# Patient Record
Sex: FEMALE | Race: WHITE
[De-identification: names, ages, dates, MRNs, and addresses within clinical notes are randomized per-mention and may not be internally consistent; named-entity substitution may affect disease eponyms.]

---

## 2019-03-13 ENCOUNTER — HOSPITAL ENCOUNTER (OUTPATIENT)
Dept: HOSPITAL 65 - MRI | Age: 64
Discharge: HOME | End: 2019-03-13
Attending: SPECIALIST
Payer: MEDICARE

## 2019-03-13 DIAGNOSIS — M51.26: ICD-10-CM

## 2019-03-13 DIAGNOSIS — M41.85: ICD-10-CM

## 2019-03-13 DIAGNOSIS — M12.88: ICD-10-CM

## 2019-03-13 DIAGNOSIS — M48.062: ICD-10-CM

## 2019-03-13 DIAGNOSIS — M47.816: Primary | ICD-10-CM

## 2019-03-13 PROCEDURE — 72148 MRI LUMBAR SPINE W/O DYE: CPT

## 2019-03-13 NOTE — DIREP
PROCEDURE:MRI L SPINE W O CONTRAST

 

TECHNIQUE:Multiplanar MR images of the lumbar spine were obtained without 

contrast.  

 

COMPARISON:[facility], MR, MRI SPINE LUMBAR W/O, 12/14/2016, 

03:33 PM.

 

INDICATIONS:SPONDYLOSIS, LUMBARSACRAL REGION

 

FINDINGS:

ALIGNMENT:Levoscoliosis centered at T12-L1.

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  There is 

depression of the superior endplate of T12 which is new as compared to the 

previous study.  There is lack of marrow edema indicating that this is remote 

injury.

PARASPINAL AREA:Normal.

OTHER:The conus medullaris is normal in signal and morphology at the proximal 

L1 level.

 

LUMBAR DISC LEVELS

T12-L1:Normal.

L1-L2:Normal.

L2-L3:Normal.

L3-L4:The disc is narrowed and desiccated.  There is circumferential disc 

bulging.  There is bilateral ligamentum flavum thickening and facet arthrosis.  

There is mild central canal narrowing with mild bilateral foraminal narrowing.

L4-L5:The disc is narrowed asymmetrically to the left with Modic type 2 

changes at the adjacent endplates.  There is bilateral facet arthrosis.  There 

is no central canal stenosis.  There is mild foraminal narrowing inferiorly on 

the left.

L5-S1:Normal.

 

CONCLUSION:

 

1.  New depression of the superior endplate of T12 as compared to previous 

study.  There is lack of marrow edema indicating remote injury.

 

2.  Levoscoliosis centered at T12-L1.

 

3.  Degenerative changes at the L3-4 and L4-5 levels unchanged from previous 

study. 

 

 

 

Dictated by: Indra Perez M.D. on 03/13/2019 at 06:56 PM     

Electronically Signed By: Indra Perez M.D. on 03/13/2019 at 07:04 PM

## 2021-02-19 ENCOUNTER — HOSPITAL ENCOUNTER (OUTPATIENT)
Dept: HOSPITAL 65 - NPLAB | Age: 66
Discharge: HOME | End: 2021-02-19
Attending: FAMILY MEDICINE
Payer: MEDICARE

## 2021-02-19 DIAGNOSIS — I50.9: Primary | ICD-10-CM

## 2021-02-19 LAB
ALP INTEST CFR SERPL: 77 U/L (ref 50–136)
ALT SERPL-CCNC: 42 U/L (ref 12–78)
AST SERPL-CCNC: 37 U/L (ref 0–35)
CALCIUM SERPL-MCNC: 8.3 MG/DL (ref 8.4–10.5)
CO2 BLDA-SCNC: 33.8 MMOL/L (ref 20–32)
ERYTHROCYTE [DISTWIDTH] IN BLOOD BY AUTOMATED COUNT: 14.6 % (ref 11.5–14.5)
GLUCOSE PRE 100 G GLC PO SERPL-MCNC: 110 MG/DL (ref 70–110)
MCH RBC QN AUTO: 30.6 PG (ref 26–34)
PLATELET # BLD AUTO: 270 10^3/UL (ref 150–400)

## 2021-02-19 PROCEDURE — 84443 ASSAY THYROID STIM HORMONE: CPT

## 2021-02-19 PROCEDURE — 80053 COMPREHEN METABOLIC PANEL: CPT

## 2021-02-19 PROCEDURE — 85027 COMPLETE CBC AUTOMATED: CPT

## 2021-03-05 ENCOUNTER — HOSPITAL ENCOUNTER (OUTPATIENT)
Dept: HOSPITAL 65 - NPLAB | Age: 66
Discharge: HOME | End: 2021-03-05
Attending: FAMILY MEDICINE
Payer: MEDICARE

## 2021-03-05 DIAGNOSIS — J96.10: Primary | ICD-10-CM

## 2021-03-05 DIAGNOSIS — G93.41: ICD-10-CM

## 2021-03-05 LAB
ALP INTEST CFR SERPL: 74 U/L (ref 50–136)
ALT SERPL-CCNC: 28 U/L (ref 12–78)
AST SERPL-CCNC: 18 U/L (ref 0–35)
CALCIUM SERPL-MCNC: 8.9 MG/DL (ref 8.4–10.5)
CO2 BLDA-SCNC: 36.3 MMOL/L (ref 20–32)
ERYTHROCYTE [DISTWIDTH] IN BLOOD BY AUTOMATED COUNT: 13.7 % (ref 11.5–14.5)
GLUCOSE PRE 100 G GLC PO SERPL-MCNC: 66 MG/DL (ref 70–110)
MCH RBC QN AUTO: 30.4 PG (ref 26–34)
PLATELET # BLD AUTO: 287 10^3/UL (ref 150–400)

## 2021-03-05 PROCEDURE — 80053 COMPREHEN METABOLIC PANEL: CPT

## 2021-03-05 PROCEDURE — 85027 COMPLETE CBC AUTOMATED: CPT

## 2021-03-25 ENCOUNTER — HOSPITAL ENCOUNTER (OUTPATIENT)
Dept: HOSPITAL 65 - RAD | Age: 66
Discharge: HOME | End: 2021-03-25
Attending: PHYSICIAN ASSISTANT
Payer: MEDICARE

## 2021-03-25 DIAGNOSIS — M16.0: Primary | ICD-10-CM

## 2021-03-25 DIAGNOSIS — M43.8X6: ICD-10-CM

## 2021-03-25 DIAGNOSIS — M47.816: ICD-10-CM

## 2021-03-25 PROCEDURE — 73502 X-RAY EXAM HIP UNI 2-3 VIEWS: CPT

## 2021-03-25 PROCEDURE — 72100 X-RAY EXAM L-S SPINE 2/3 VWS: CPT

## 2021-03-25 NOTE — DIREP
PROCEDURE:XRAY HIP MIN 2 VW-RT

 

COMPARISON:None.

 

INDICATIONS:PAIN IN RIGHT HIP M25.551, SACROILIITIS M46.1, LOW BACK PAIN M54.5

 

FINDINGS:

BONES:No acute fracture.  No aggressive osseous lesion.

JOINTS:Mild degenerative change of the bilateral femoral acetabular joints 

including joint space narrowing.  There is degenerative change of the right 

greater than left sacroiliac joint.

SOFT TISSUES:Normal.

OTHER:Inter spinous process spacers at the lower lumbar spine.  Metallic clip 

overlies the left pelvis..

 

CONCLUSION:

No acute fracture.

Mild degenerative change of the hips.

 

 

Dictated by: Madi Ruiz MD on 03/25/2021 at 04:32 PM     

Electronically Signed By: aMdi Ruiz MD on 03/25/2021 at 04:34 PM

## 2021-03-25 NOTE — DIREP
PROCEDURE:XRAY SPINE LUMBAR 2-3 VWS

 

COMPARISON:None.

 

INDICATIONS:LOW BACK PAIN M54.5, SACROILIITIS M46.1

 

FINDINGS:

ALIGNMENT:Straightening of the normal lumbar lordosis.  Levocurvature of the 

lumbar spine.

VERTEBRAE:The vertebral heights are preserved.  There posterior interspinous 

process spacers at the L3-L4 L4-5 levels.  There is facet joint hypertrophy 

throughout the mid lower lumbar spine.

DISK SPACES:There is disc space narrowing throughout the lumbar spine, worse 

at L4-5 and L5-S1.  Endplate sclerosis is seen at L4-5.

SPONDYLOLISTHESIS:No significant spondylolisthesis.

SACROILIAC JOINTS:Degenerative change of the right greater left sacroiliac 

joints.

OTHER:Extensive calcification of the infrarenal abdominal aorta.

 

 

CONCLUSION:

Multilevel degenerative changes lumbar spine, as detailed above.  Findings are 

most prominent at L4-5 and L5-S1.

Interspinous process spacers are seen at the L3-L4 and L4-5 levels.  Hardware 

is well seated and intact.

 

 

Dictated by: Madi Ruiz MD on 03/25/2021 at 04:34 PM     

Electronically Signed By: Madi Ruiz MD on 03/25/2021 at 04:36 PM

## 2021-06-08 ENCOUNTER — HOSPITAL ENCOUNTER (OUTPATIENT)
Dept: HOSPITAL 65 - RAD | Age: 66
Discharge: HOME | End: 2021-06-08
Attending: INTERNAL MEDICINE
Payer: MEDICARE

## 2021-06-08 DIAGNOSIS — Z12.31: Primary | ICD-10-CM

## 2021-06-08 DIAGNOSIS — M85.88: ICD-10-CM

## 2021-06-08 PROCEDURE — 77067 SCR MAMMO BI INCL CAD: CPT

## 2021-06-08 PROCEDURE — 77080 DXA BONE DENSITY AXIAL: CPT

## 2021-06-08 NOTE — DIREP
PROCEDURE:BONE DENSITY PERIPHERAL

INDICATIONS:OSTEOPOROSIS SCREENIG

 

COMPARISON:None.

 

FINDINGS:

Femur

 

Proximal RIGHT femur bone mineral density (BMD) (g/cm2):  0.767 T-score : 

-1.9



Proximal LEFT femur bone mineral density (BMD) (g/cm2):  0.741T-score: -2.1



LEFT FOREARM 

 

1/3:

bone mineral density (g/cm  squared):  0.768T-score: -1.3

 

UD:

 bone mineral density (g/cm  squared):  0.320T-score: -3.3

 

Total:   

bone mineral density (g/cm  squared):  0.578T-score: -1.7

 

 

CONCLUSION:

1.  Osteopenia overall right radius.  Osteopenia overall bilateral hips.

 

2. Based on the [place] FRAX study, the patient's 10-year probability of a 

major osteoporotic fracture

(clinical spine, forearm, hip or shoulder) is 10.3 %, and the 10-year 

probability of a hip fracture is 1.5 %.

 

SUGGESTED RECOMMENDATIONS:

Normal & Osteopenia:Consideration should be given to use of calcium 

supplementation, daily multiple vitamins and adequate exercise, as preventive 

measures against osteoporosis, if clinically indicated.

 

Osteoporosis & Severe Osteoporosis:In addition to the above, consideration 

should be given to medical therapy against osteoporosis, if clinically 

indicated.

 

 

Dictated by: George Calderón M.D. on 06/08/2021 at 04:52 PM     

Electronically Signed By: George Calderón M.D. on 06/08/2021 at 04:54 PM

## 2021-06-08 NOTE — DIREP
PROCEDURE:Digital Screening Mammogram

 

TECHNIQUE:MLO and CC digital images of each breast are provided.  Computer 

Assisted Detection (CAD) was utilized.  

 

 

COMPARISON:None.

 

INDICATIONS:SCREENING

 

BREAST COMPOSITION:Almost entirely fatty.

 

FINDINGS:There are no grouped microcalcifications, masses, or architectural 

distortions to suggest malignancy.   

 

IMPRESSION:No mammographic evidence of malignancy.   

 

RECOMMENDATIONS:Routine Screening Mammography per [employer] 

[employer] guidelines.    

 

 

OVERALL FINAL ASSESSMENT:BI-RADS 1 - Negative Mammogram

Note:  This facility participates in a mammography screening patient reminder 

system.

 

 

Dictated by: Eran Reece III, MD on 06/08/2021 at 02:58 PM     

Electronically Signed By: Eran Reece III, MD on 06/08/2021 at 03:00 PM